# Patient Record
Sex: MALE | Race: ASIAN | NOT HISPANIC OR LATINO | Employment: STUDENT | URBAN - METROPOLITAN AREA
[De-identification: names, ages, dates, MRNs, and addresses within clinical notes are randomized per-mention and may not be internally consistent; named-entity substitution may affect disease eponyms.]

---

## 2017-03-10 ENCOUNTER — OFFICE VISIT (OUTPATIENT)
Dept: ALLERGY | Facility: CLINIC | Age: 27
End: 2017-03-10
Payer: COMMERCIAL

## 2017-03-10 ENCOUNTER — LAB VISIT (OUTPATIENT)
Dept: LAB | Facility: HOSPITAL | Age: 27
End: 2017-03-10
Attending: ALLERGY & IMMUNOLOGY
Payer: COMMERCIAL

## 2017-03-10 VITALS
HEIGHT: 72 IN | HEART RATE: 95 BPM | DIASTOLIC BLOOD PRESSURE: 90 MMHG | WEIGHT: 247.38 LBS | OXYGEN SATURATION: 96 % | SYSTOLIC BLOOD PRESSURE: 155 MMHG | BODY MASS INDEX: 33.51 KG/M2

## 2017-03-10 DIAGNOSIS — H10.45 CONJUNCTIVITIS, CHRONIC ALLERGIC: Chronic | ICD-10-CM

## 2017-03-10 DIAGNOSIS — R05.9 COUGH: ICD-10-CM

## 2017-03-10 DIAGNOSIS — R05.9 COUGH: Chronic | ICD-10-CM

## 2017-03-10 DIAGNOSIS — J31.0 CHRONIC RHINITIS: ICD-10-CM

## 2017-03-10 DIAGNOSIS — J31.0 CHRONIC RHINITIS: Primary | Chronic | ICD-10-CM

## 2017-03-10 PROCEDURE — 82785 ASSAY OF IGE: CPT

## 2017-03-10 PROCEDURE — 99204 OFFICE O/P NEW MOD 45 MIN: CPT | Mod: S$GLB,,, | Performed by: ALLERGY & IMMUNOLOGY

## 2017-03-10 PROCEDURE — 86003 ALLG SPEC IGE CRUDE XTRC EA: CPT | Mod: 59

## 2017-03-10 PROCEDURE — 86003 ALLG SPEC IGE CRUDE XTRC EA: CPT

## 2017-03-10 PROCEDURE — 99999 PR PBB SHADOW E&M-NEW PATIENT-LVL III: CPT | Mod: PBBFAC,,, | Performed by: ALLERGY & IMMUNOLOGY

## 2017-03-10 PROCEDURE — 1160F RVW MEDS BY RX/DR IN RCRD: CPT | Mod: S$GLB,,, | Performed by: ALLERGY & IMMUNOLOGY

## 2017-03-10 PROCEDURE — 36415 COLL VENOUS BLD VENIPUNCTURE: CPT | Mod: PO

## 2017-03-10 RX ORDER — MINERAL OIL
180 ENEMA (ML) RECTAL DAILY
COMMUNITY
End: 2018-07-18 | Stop reason: CLARIF

## 2017-03-10 NOTE — PROGRESS NOTES
"  Referring physician: No ref. provider found    Primary Care Physician: Primary Doctor No    Chief Complaint: Consult ("my allergies are acting up"watery eyes); Nasal Congestion; and Sinusitis    Patient is new to me  Patient is not accompanied.    Subjective:         HPI    Oracio Coley is a 27 y.o. male here for an evaluation related to chronic respiratory symptoms.  Patient reports that for the past 2-1/2 years he has had almost daily symptoms of rhinorrhea, postnasal drip with throat clearing and nasal itching.  He is also had almost daily watery and itchy eyes.  He also reports having itchy ears.  He also notes that he has frequent coughing.  He reports having a sensation in his chest that is like secretions are building up.  He also reports that he does have nocturnal awakening related to this chest sensation.  He reports that he takes Allegra for this symptom.  Patient reports that he takes Allegra 180 mg almost every 24 hours.  Despite this medication he does have breakthrough symptoms.  Patient reports that in 2010 he noted the onset of symptoms which included itchy eyes itchy ears and runny nose wall at a friend's house where there was a CAT residence.  After leaving the house symptoms continued for a while but then within 12 hours spontaneously resolved without medications.  Patient currently has a girlfriend who lives with a roommate who has a cat.  Patient does not visit his girlfriend's residence because of the problems he's noted in the past with cats.  However his girlfriend does visit his house frequently.  Patient is concerned that his chest symptoms may indicate that he is having some asthma.      Review of Systems  Constitutional: Negative for changes in appetite, unintentional weight loss, fever, chills and fatigue.   HENT: Negative for facial pain, nose bleeds, nasal congestion,  sinus pressure, and  voice change. Negative for ear discharge, ear pain, facial swelling, sore throat and trouble " swallowing.  Positive for postnasal drip, clearing her throat, itchy nose, and rhinorrhea.  Positive for itchy ears  Eyes: Negative for occular discharge, redness, and visual disturbance.  Positive for watery itchy eyes  Respiratory: Negative for chest tightness, shortness of breath, dyspnea on exertion, sputum production.  Positive for cough and possibly wheezing.  Cardiovascular: Negative for chest pain, palpatations and leg swelling.  Gastrointestinal: Negative for abdominal distension, abdominal pain, constipation, diarrhea, nausea,and vomiting.   Genitourinary: Negative for difficulty urinating.   Musculoskeletal: Negative for arthralgias, gait problems, joint swelling, myalgias and back pain.   The discharge of this problem.  The all Neurological: Negative for dizziness, syncope, weakness, light-headedness, and headaches.   Hematological: Negative for adenopathy, does not bruise or bleed easily.  Psychiatric/Behavioral: Negative for agitation, anxiety, behavioral problems, confusion, and insomnia.  Skin: Negative for rash.  Negative for pruritus. negative for dry skin    PMH:  Past medical history reviewed with patient and current for this visit.  Past Skin Test results: Patient is ever been skin tested  Past Immunotherapy: Patient has never been on immunotherapy    Family History:  Family history reviewed with patient and current for this visit    Social:  Smoker: Patient is a nonsmoker  Occupation: Patient is a ninth-grade     Environmental History:  Environmental history reviewed with patient and current for this visit          Objective:          Physical Exam  General:patient is well developed and well nourished, in no acute distress.  Mental Status:  Alert, oriented and cooperative  Head and Face: normocephalic   Allergic shiners: No  Eyes:   Pupils: ERRLA: Scleral conjunctiva: clear; Cornea: clear; Palprebal conjunctiva: normal: Eyelid Skin: normal  Ears:Tympanic membrane Left:normal  light reflex; Right: normal light reflex; External canals normal  Nose:  Nares: patent; Mucosa :pink and moist pink; Nasal septum: midline;Turbinates are of normal size bilaterally and do not occlude the nasal airway.  Mouth/Pharynx: tonsils present; posterior pharyngeal wall normal; tongue normal; teeth normal; voice quality normal.  Neck:  Cervical lymph nodes: small, non-tender, freely moveable both anterior and posterior cervical chain; Trachea: midline; Masses: none  Lungs: Air movement is good; respiratory effort is good; no respiratory distress; breath sounds are vesicular in all lung fields; no wheezing; normal expiratory time; patient is having a dry/semi-productive cough  Heart: regular rate and rhythm with mild respiratory variation; A1 and P2 are normal; no murmurs or gallops.  Abdomen:exam not done  Extremities: no cyanosis, clubbing or edema  Skin:no rashes or lesions present; skin hydrated and supple.    Skin Test Results: Deferred for today as patient is on Allegra.          Assessment:       1. Chronic rhinitis  D. farinae IgE    D. pteronyssinus IgE    IgE    Dog dander IgE    ALLERGEN CAT EPITHELLIUM    Complete PFT with bronchodilator    CANCELED: Pulmonary function test   2. Cough  D. farinae IgE    D. pteronyssinus IgE    IgE    Dog dander IgE    ALLERGEN CAT EPITHELLIUM    Complete PFT with bronchodilator    CANCELED: Pulmonary function test   3. Conjunctivitis, chronic allergic             Plan:         Return for re-visit: Return in about 4 weeks (around 4/7/2017).    Immunotherapy: No    Lab or X-ray: Yes; patient will get total IgE level along with allergen specific IgE levels to house dust mites, dogs and cats.    Outside medical records requested: No        Patient Instructions   1.  Patient will get lab today and will check as patient portal for the results.  2.  Patient will start Flonase 2 sprays in each nostril every 24 hours and continue this long-term  3.  Patient will get  pulmonary functions next week.  And after his pulmonary functions were done he will start Singulair 10 mg 1 tablet every 24 hours.  4.  Patient can continue his Allegra 180 mg per tablet every 24 hours as needed.  5.  I requested a revisit in 4 weeks, however the patient understands that he is to contact me if there are problems prior to this revisit.  Patient understands that if the lab does not indicate a specific ALLERGY, at some later date skin testing may be necessary.    Patient education:Nasal sinus physiology reviewed.  The role of inflammatory changes in symptom production was reviewed.  The role of sinusitis in producing postnasal drip and cough was reviewed.  The role of sinobronchial pathways in producing cough was reviewed. The role of reflux disease or gastroesophageal or laryngeal pharyngeal in producing cough was reviewed.  The role of using anti-inflammatory drugs to reduce the level of inflammation and thus the symptoms was reviewed.  Also reviewed was the possibility of cough equivalent asthma as the underlying etiology of this cough.  Patient understands that pulmonary functions will be helpful in elucidating the source of his cough.  The handout for rhinitis and sinusitis was reviewed with the patient.  Patient expressed understanding of these concepts and questions were answered.            Michaelle Walls MD

## 2017-03-10 NOTE — PATIENT INSTRUCTIONS
1.  Patient will get lab today and will check as patient portal for the results.  2.  Patient will start Flonase 2 sprays in each nostril every 24 hours and continue this long-term  3.  Patient will get pulmonary functions next week.  And after his pulmonary functions were done he will start Singulair 10 mg 1 tablet every 24 hours.  4.  Patient can continue his Allegra 180 mg per tablet every 24 hours as needed.  5.  I requested a revisit in 4 weeks, however the patient understands that he is to contact me if there are problems prior to this revisit.  Patient understands that if the lab does not indicate a specific ALLERGY, at some later date skin testing may be necessary.

## 2017-03-13 LAB
CAT DANDER IGE QN: 36.2 KU/L
D FARINAE IGE QN: <0.35 KU/L
D PTERONYSS IGE QN: <0.35 KU/L
DEPRECATED CAT DANDER IGE RAST QL: ABNORMAL
DEPRECATED D FARINAE IGE RAST QL: NORMAL
DEPRECATED D PTERONYSS IGE RAST QL: NORMAL
DEPRECATED DOG DANDER IGE RAST QL: ABNORMAL
DOG DANDER IGE QN: 3.81 KU/L
IGE SERPL-ACNC: 223 IU/ML

## 2017-03-15 ENCOUNTER — HOSPITAL ENCOUNTER (OUTPATIENT)
Dept: RESPIRATORY THERAPY | Facility: HOSPITAL | Age: 27
Discharge: HOME | End: 2017-03-15
Attending: ALLERGY & IMMUNOLOGY
Payer: COMMERCIAL

## 2017-03-15 DIAGNOSIS — R05.9 COUGH: Chronic | ICD-10-CM

## 2017-03-15 DIAGNOSIS — J31.0 CHRONIC RHINITIS: Chronic | ICD-10-CM

## 2017-03-23 ENCOUNTER — TELEPHONE (OUTPATIENT)
Dept: ALLERGY | Facility: CLINIC | Age: 27
End: 2017-03-23

## 2017-03-31 ENCOUNTER — OFFICE VISIT (OUTPATIENT)
Dept: ALLERGY | Facility: CLINIC | Age: 27
End: 2017-03-31
Payer: COMMERCIAL

## 2017-03-31 VITALS
SYSTOLIC BLOOD PRESSURE: 132 MMHG | DIASTOLIC BLOOD PRESSURE: 90 MMHG | BODY MASS INDEX: 33.62 KG/M2 | WEIGHT: 248.25 LBS | OXYGEN SATURATION: 96 % | HEIGHT: 72 IN | HEART RATE: 86 BPM

## 2017-03-31 DIAGNOSIS — H10.45 CONJUNCTIVITIS, ALLERGIC, CHRONIC: Chronic | ICD-10-CM

## 2017-03-31 DIAGNOSIS — Z88.9: ICD-10-CM

## 2017-03-31 DIAGNOSIS — R05.9 COUGH: Chronic | ICD-10-CM

## 2017-03-31 DIAGNOSIS — J30.1 NON-SEASONAL ALLERGIC RHINITIS DUE TO POLLEN: Chronic | ICD-10-CM

## 2017-03-31 DIAGNOSIS — J30.81 NON-SEASONAL ALLERGIC RHINITIS DUE TO ANIMAL HAIR AND DANDER: Primary | Chronic | ICD-10-CM

## 2017-03-31 PROBLEM — J30.89 ALLERGIC RHINITIS DUE TO HOUSE DUST MITE: Chronic | Status: ACTIVE | Noted: 2017-03-31

## 2017-03-31 PROBLEM — J30.89 ALLERGIC RHINITIS DUE TO HOUSE DUST MITE: Chronic | Status: RESOLVED | Noted: 2017-03-31 | Resolved: 2017-03-31

## 2017-03-31 PROCEDURE — 99214 OFFICE O/P EST MOD 30 MIN: CPT | Mod: 25,S$GLB,, | Performed by: ALLERGY & IMMUNOLOGY

## 2017-03-31 PROCEDURE — 1160F RVW MEDS BY RX/DR IN RCRD: CPT | Mod: S$GLB,,, | Performed by: ALLERGY & IMMUNOLOGY

## 2017-03-31 PROCEDURE — 95079 INGEST CHALLENGE ADDL 60 MIN: CPT | Mod: S$GLB,,, | Performed by: ALLERGY & IMMUNOLOGY

## 2017-03-31 PROCEDURE — 99999 PR PBB SHADOW E&M-EST. PATIENT-LVL III: CPT | Mod: PBBFAC,,, | Performed by: ALLERGY & IMMUNOLOGY

## 2017-03-31 RX ORDER — MONTELUKAST SODIUM 10 MG/1
10 TABLET ORAL NIGHTLY
Qty: 30 TABLET | Refills: 6 | Status: SHIPPED | OUTPATIENT
Start: 2017-03-31 | End: 2017-05-22 | Stop reason: SDUPTHER

## 2017-03-31 RX ORDER — FLUTICASONE PROPIONATE 50 MCG
2 SPRAY, SUSPENSION (ML) NASAL DAILY
COMMUNITY
End: 2018-07-18 | Stop reason: CLARIF

## 2017-03-31 RX ORDER — PREDNISONE 20 MG/1
20 TABLET ORAL 3 TIMES DAILY
Qty: 18 TABLET | Refills: 0 | Status: SHIPPED | OUTPATIENT
Start: 2017-03-31 | End: 2017-04-06

## 2017-03-31 NOTE — PROGRESS NOTES
Referring physician: No ref. provider found    Primary Care Physician: Primary Doctor No    Chief Complaint: Follow-up and Allergy Testing    Patient is known to me. The last visit was 3/10/2017  Patient is accompanied: No  Diagnosis at previous visit:  1.  Chronic rhinitis  2.  Cough  3.  Chronic conjunctivitis        Subjective:         HPI    Oracio Coley is a 27 y.o. male here for a follow-up visit related to chronic respiratory symptoms and skin testing.  Patient states that he did not start using his Flonase nor did he start Singulair after his previous visit.  He states that although his symptoms are somewhat better he still continues to have them.  He continues to have some nasal symptoms ocular symptoms and cough. His symptoms remain relatively unchanged from his previous visit.  He is here today primarily for skin testing.     Patient has some concerns because he is going to visit his girlfriend's parents house and there are cats and residents there.  He is concerned that he will be significant increase in his symptoms while visiting.    Summary visit 3/10/2017 with me:  Oracio Coley is a 27 y.o. male here for an evaluation related to chronic respiratory symptoms. Patient reports that for the past 2-1/2 years he has had almost daily symptoms of rhinorrhea, postnasal drip with throat clearing and nasal itching. He is also had almost daily watery and itchy eyes. He also reports having itchy ears. He also notes that he has frequent coughing. He reports having a sensation in his chest that is like secretions are building up. He also reports that he does have nocturnal awakening related to this chest sensation. He reports that he takes Allegra for this symptom. Patient reports that he takes Allegra 180 mg almost every 24 hours. Despite this medication he does have breakthrough symptoms.  Patient reports that in 2010 he noted the onset of symptoms which included itchy eyes itchy ears and runny nose wall at a  friend's house where there was a CAT residence. After leaving the house symptoms continued for a while but then within 12 hours spontaneously resolved without medications.  Patient currently has a girlfriend who lives with a roommate who has a cat. Patient does not visit his girlfriend's residence because of the problems he's noted in the past with cats. However his girlfriend does visit his house frequently.  Patient is concerned that his chest symptoms may indicate that he is having some asthma.      Review of Systems  Constitutional: Negative for changes in appetite, unintentional weight loss, fever, chills and fatigue.   HENT: Negative for facial pain, nose bleeds, nasal congestion, postnasal drip, throat clearing, sinus pressure, and voice change. Negative for ear discharge, ear pain, facial swelling, sore throat and trouble swallowing.  Eyes: Negative for occular discharge, redness, itching and visual disturbance.  Respiratory: Negative for chest tightness, shortness of breath, wheezing, dyspnea on exertion, sputum production and cough.   Cardiovascular: Negative for chest pain, palpatations and leg swelling.  Gastrointestinal: Negative for abdominal distension, abdominal pain, constipation, diarrhea, nausea,and vomiting.   Genitourinary: Negative for difficulty urinating.   Musculoskeletal: Negative for arthralgias, gait problems, joint swelling, myalgias and back pain.   Neurological: Negative for dizziness, syncope, weakness, light-headedness, and headaches.   Hematological: Negative for adenopathy, does not bruise or bleed easily.  Psychiatric/Behavioral: Negative for agitation, anxiety, behavioral problems, confusion, and insomnia.  Skin: Negative for rash.     PMH:  Reviewed with the patient and current for this visit    Family History:  Reviewed with the patient and current for this visit    Social History:  Reviewed with the patient and current for this visit     Environmental History:  Reviewed with  the patient and current for this visit          Objective:        Physical Exam  General:patient is well developed and well nourished, in no acute distress.  Mental Status:  Alert, oriented and cooperative  Head and Face: normocephalic   Allergic shiners: No  Eyes:   Pupils: ERRLA: Scleral conjunctiva: clear; Cornea: clear; Palprebal conjunctiva: normal: Eyelid Skin: normal  Ears:Tympanic membrane Left:intact and normal light reflex; Right:intact and normal light reflex; External canals normal bilaterally.  Nose:  Nares: patent; Mucosa :pink and moist; Nasal septum: midline;Turbinates are of normal size bilaterally and do not occlude the nasal airway.  Mouth/Pharynx: tonsils present; posterior pharyngeal wall normal; tongue normal; teeth normal; voice quality normal.  Neck:  Cervical lymph nodes: small, non-tender, freely moveable both anterior and posterior cervical chain; Trachea: midline; Masses: none  Lungs: Air movement is good; respiratory effort is good; no respiratory distress; breath sounds are vesicular in all lung fields; no wheezing; normal expiratory time; no cough.  Heart: regular rate and rhythm with mild respiratory variation; A1 and P2 are normal; no murmurs or gallops.  Abdomen:exam not done  Extremities: no cyanosis, clubbing or edema  Skin:no rashes or lesions present; skin hydrated and supple.    Skin Test Results: Prick skin tests were done today to a panel of 58 separate aeroallergens.  Patient had strong positive prick skin test to cat, dog, and grass pollens.  He also had positive prick skin test to tree pollens.  His positive histamine control and his negative saline control indicates this is a valid assessment of ALLERGIC recognition    Pulmonary function test: Patient had pulmonary function tests done on 3/15/2017 which were normal.    Laboratory evaluation: Patient had allergen specific IgE done 3/10/2017.  He was negative for house dust mites but strongly positive for cat allergen.  He  also had a mild positive to dog allergen.  His total IgE level was 223       Assessment:       1. Non-seasonal allergic rhinitis due to animal hair and dander     2. Non-seasonal allergic rhinitis due to pollen     3. Cough: PFTs normal     4. Conjunctivitis, allergic, chronic     5. Atopic diathesis: Positive prick skin test to cat, dog, and grass.             Plan:         Return for re-visit: Return in about 6 weeks (around 5/12/2017).    Immunotherapy: No    Lab or X-ray: No    Outside medical records requested: No        Patient Instructions   1.  Patient is to start Flonase 2 sprays in each nostril every 24 hours and Singulair 10 mg every 24 hours.  Patient understands to continue these medicines long-term.  2.  Patient understands and Allegra if needed for runny itchy sneezy symptoms.  3.  For his upcoming trip, I prescribed some prednisone to be used if needed for severe symptoms.  The prednisone directions are 20 mg 3 times a day for 6 days.  Patient understands to start this if his symptoms become severe.  I've also reviewed the side effects of steroids with the patient  4.  I requested a revisit in 6 weeks.  Patient understands that there are problems prior to that he is to contact me.      25 minutes spent face to face with this patient. 50% of time spent counseling this patient about the medical conditions (pathophysiology), the options and strategies for treatments, and the risks and benefits of treatments.    Patient education content included: Nasal sinus physiology reviewed.  The role of inflammatory changes in symptom production was reviewed.  The role of sinusitis in producing postnasal drip and cough was reviewed.  The role of sinobronchial pathways in producing cough was reviewed. The role of reflux disease or gastroesophageal or laryngeal pharyngeal in producing cough was reviewed.  The role of using anti-inflammatory drugs to reduce the level of inflammation and thus the symptoms was reviewed.   The handout for rhinitis and sinusitis was reviewed with the patient.  Pathophysiology of cough reviewed.  Normal pulmonary functions indicates this is probably not an asthma cough but if problems persist inhaled corticosteroids for asthma could be considered as an option.      Patient expressed understanding of these concepts and questions were answered.                Michaelle Walls MD

## 2017-03-31 NOTE — PATIENT INSTRUCTIONS
1.  Patient is to start Flonase 2 sprays in each nostril every 24 hours and Singulair 10 mg every 24 hours.  Patient understands to continue these medicines long-term.  2.  Patient understands and Allegra if needed for runny itchy sneezy symptoms.  3.  For his upcoming trip, I prescribed some prednisone to be used if needed for severe symptoms.  The prednisone directions are 20 mg 3 times a day for 6 days.  Patient understands to start this if his symptoms become severe.  I've also reviewed the side effects of steroids with the patient  4.  I requested a revisit in 6 weeks.  Patient understands that there are problems prior to that he is to contact me.

## 2017-05-29 RX ORDER — MONTELUKAST SODIUM 10 MG/1
10 TABLET ORAL NIGHTLY
Qty: 30 TABLET | Refills: 6 | Status: SHIPPED | OUTPATIENT
Start: 2017-05-29 | End: 2017-08-07 | Stop reason: SDUPTHER

## 2017-08-08 RX ORDER — MONTELUKAST SODIUM 10 MG/1
10 TABLET ORAL NIGHTLY
Qty: 30 TABLET | Refills: 2 | Status: SHIPPED | OUTPATIENT
Start: 2017-08-08 | End: 2017-08-15 | Stop reason: SDUPTHER

## 2017-08-08 NOTE — TELEPHONE ENCOUNTER
Spoke with pt letting him know that he has to scheduled a appt with  in order to have any more refills authorized pt stated that he thought he had unlimited refills for his medication.

## 2017-08-08 NOTE — TELEPHONE ENCOUNTER
patient is required to follow up with Dr. Walls for an office visit prior to any additional refills being authorized

## 2017-08-27 RX ORDER — MONTELUKAST SODIUM 10 MG/1
10 TABLET ORAL NIGHTLY
Qty: 30 TABLET | Refills: 6 | Status: SHIPPED | OUTPATIENT
Start: 2017-08-27 | End: 2018-07-18

## 2018-05-08 NOTE — PROGRESS NOTES
This note was created by combination of typed  and Dragon dictation.  Transcription errors may be present.  If there are any questions, please contact me.    Assessment & Plan:   ADDENDUM:   [facility] updated the records - rec'd HAV #15 2015 and typhoid. So UTD typhoid but would recommend HAV #2 and HBV #2 and #3.    ==============    Travel advice encounter - according to the immunization registry he needs to finish out his hepatitis-B shot series.  [facility] and thinks he had hepatitis a vaccine.  I will try to get old records from [facility] about vaccinations.  If not up-to-date would recommend hepatitis a shot as well as typhoid vaccine.  I have given him Cipro for traveler's diarrhea if it should arise.  Otherwise form filled out, no restrictions for travel  -     Discontinue: ciprofloxacin HCl (CIPRO) 500 MG tablet; Take 1 tablet (500 mg total) by mouth every 12 (twelve) hours.  Dispense: 10 tablet; Refill: 0  -     ciprofloxacin HCl (CIPRO) 500 MG tablet; Take 1 tablet (500 mg total) by mouth every 12 (twelve) hours.  Dispense: 10 tablet; Refill: 0        Medications Discontinued During This Encounter   Medication Reason    ciprofloxacin HCl (CIPRO) 500 MG tablet Reorder     Modified Medications    No medications on file     New Prescriptions    CIPROFLOXACIN HCL (CIPRO) 500 MG TABLET    Take 1 tablet (500 mg total) by mouth every 12 (twelve) hours.       Follow Up: No Follow-up on file.        Subjective:     Chief Complaint   Patient presents with    Follow-up       HPI  Oracio is a 28 y.o. male, last appointment with this clinic was Visit date not found.    NP  Upcoming international travel, he will be traveling with an [employer] groups to [place] to visit schools, at the grade school and high school level, as part of an information exchange about teaching and administrative methods.  The be traveling May 31st through mid June for 2  weeks.    He recalls having on the [facility] for vaccines about 3 years ago with travel to [place].  Thinks he has had hepatitis a vaccination.    We were able to pull up his [place] vaccine registry.  Up-to-date on tetanus, but had only had 1 of the hepatitis B shot series.  Hepatitis-A was not recorded.  I will try to obtain records from [facility].    Patient Care Team:  Primary Doctor No as PCP - General    Patient Active Problem List    Diagnosis Date Noted    Non-seasonal allergic rhinitis due to animal hair and dander 03/31/2017    Conjunctivitis, allergic, chronic 03/31/2017    Chronic cough 03/31/2017    Non-seasonal allergic rhinitis due to pollen 03/31/2017       PAST MEDICAL HISTORY:  History reviewed. No pertinent past medical history.    PAST SURGICAL HISTORY:  History reviewed. No pertinent surgical history.    Family History   Problem Relation Age of Onset    No Known Problems Mother     Arrhythmia Father     Gout Brother     Sleep apnea Brother        SOCIAL HISTORY:  Social History     Social History    Marital status: Single     Spouse name: N/A    Number of children: N/A    Years of education: N/A     Occupational History     of [employer]      Social History Main Topics    Smoking status: Never Smoker    Smokeless tobacco: Never Used    Alcohol use Yes      Comment: rarely    Drug use: No    Sexual activity: Not on file     Other Topics Concern    Not on file     Social History Narrative    No narrative on file       ALLERGIES AND MEDICATIONS: updated and reviewed.  Review of patient's allergies indicates:  No Known Allergies  Current Outpatient Prescriptions   Medication Sig Dispense Refill    fexofenadine (ALLEGRA) 180 MG tablet Take 180 mg by mouth once daily.      fluticasone (FLONASE) 50 mcg/actuation nasal spray 2 sprays by Each Nare route once daily.      montelukast (SINGULAIR) 10 mg tablet Take 1 tablet (10 mg total) by  "mouth every evening. 30 tablet 6     No current facility-administered medications for this visit.        Review of Systems   Constitutional: Negative for fever, malaise/fatigue and weight loss.   HENT: Negative for congestion.    Eyes: Negative for blurred vision and pain.   Respiratory: Negative for shortness of breath.    Cardiovascular: Negative for chest pain, palpitations and leg swelling.   Gastrointestinal: Negative for abdominal pain and blood in stool.   Musculoskeletal: Positive for joint pain.        Occ shoulder pain   Neurological: Negative for weakness.       Objective:   Physical Exam   Vitals:    05/09/18 1015 05/09/18 1046   BP: 136/88 130/86   BP Location: Right arm Left arm   Patient Position: Sitting Sitting   BP Method: Large (Automatic) Large (Manual)   Pulse: 75    Temp: 98.3 °F (36.8 °C)    TempSrc: Oral    SpO2: 96%    Weight: 111.8 kg (246 lb 7.6 oz)    Height: 5' 11" (1.803 m)     Body mass index is 34.38 kg/m².  Weight: 111.8 kg (246 lb 7.6 oz)   Height: 5' 11" (180.3 cm)     Physical Exam   Constitutional: He is oriented to person, place, and time. He appears well-developed and well-nourished. No distress.   Eyes: EOM are normal.   Cardiovascular: Normal rate, regular rhythm and normal heart sounds.    No murmur heard.  Pulmonary/Chest: Effort normal and breath sounds normal.   Abdominal: Soft. He exhibits no distension and no mass. There is no tenderness. There is no guarding.   Musculoskeletal: Normal range of motion.   Neurological: He is alert and oriented to person, place, and time. Coordination normal.   Skin: Skin is warm and dry.   Psychiatric: He has a normal mood and affect. His behavior is normal. Thought content normal.     "

## 2018-05-09 ENCOUNTER — OFFICE VISIT (OUTPATIENT)
Dept: FAMILY MEDICINE | Facility: CLINIC | Age: 28
End: 2018-05-09
Payer: COMMERCIAL

## 2018-05-09 ENCOUNTER — PATIENT MESSAGE (OUTPATIENT)
Dept: FAMILY MEDICINE | Facility: CLINIC | Age: 28
End: 2018-05-09

## 2018-05-09 VITALS
WEIGHT: 246.5 LBS | HEART RATE: 75 BPM | DIASTOLIC BLOOD PRESSURE: 86 MMHG | BODY MASS INDEX: 34.51 KG/M2 | SYSTOLIC BLOOD PRESSURE: 130 MMHG | HEIGHT: 71 IN | TEMPERATURE: 98 F | OXYGEN SATURATION: 96 %

## 2018-05-09 DIAGNOSIS — Z71.84 TRAVEL ADVICE ENCOUNTER: Primary | ICD-10-CM

## 2018-05-09 PROCEDURE — 99203 OFFICE O/P NEW LOW 30 MIN: CPT | Mod: S$GLB,,, | Performed by: INTERNAL MEDICINE

## 2018-05-09 PROCEDURE — 99999 PR PBB SHADOW E&M-EST. PATIENT-LVL III: CPT | Mod: PBBFAC,,, | Performed by: INTERNAL MEDICINE

## 2018-05-09 PROCEDURE — 3008F BODY MASS INDEX DOCD: CPT | Mod: CPTII,S$GLB,, | Performed by: INTERNAL MEDICINE

## 2018-05-09 RX ORDER — CIPROFLOXACIN 500 MG/1
500 TABLET ORAL EVERY 12 HOURS
Qty: 10 TABLET | Refills: 0 | Status: SHIPPED | OUTPATIENT
Start: 2018-05-09 | End: 2018-05-09 | Stop reason: SDUPTHER

## 2018-05-09 RX ORDER — CIPROFLOXACIN 500 MG/1
500 TABLET ORAL EVERY 12 HOURS
Qty: 10 TABLET | Refills: 0 | Status: SHIPPED | OUTPATIENT
Start: 2018-05-09 | End: 2018-07-18 | Stop reason: CLARIF

## 2018-07-18 ENCOUNTER — OFFICE VISIT (OUTPATIENT)
Dept: PODIATRY | Facility: CLINIC | Age: 28
End: 2018-07-18
Payer: COMMERCIAL

## 2018-07-18 ENCOUNTER — HOSPITAL ENCOUNTER (OUTPATIENT)
Dept: RADIOLOGY | Facility: HOSPITAL | Age: 28
Discharge: HOME | End: 2018-07-18
Attending: PODIATRIST
Payer: COMMERCIAL

## 2018-07-18 VITALS
DIASTOLIC BLOOD PRESSURE: 87 MMHG | WEIGHT: 246 LBS | HEIGHT: 61 IN | HEART RATE: 94 BPM | SYSTOLIC BLOOD PRESSURE: 157 MMHG | BODY MASS INDEX: 46.44 KG/M2

## 2018-07-18 DIAGNOSIS — M24.573 EQUINUS CONTRACTURE OF ANKLE: ICD-10-CM

## 2018-07-18 DIAGNOSIS — M79.672 FOOT PAIN, LEFT: ICD-10-CM

## 2018-07-18 DIAGNOSIS — M10.00 ACUTE IDIOPATHIC GOUT, UNSPECIFIED SITE: Primary | ICD-10-CM

## 2018-07-18 DIAGNOSIS — M10.00 ACUTE IDIOPATHIC GOUT, UNSPECIFIED SITE: ICD-10-CM

## 2018-07-18 PROCEDURE — 3008F BODY MASS INDEX DOCD: CPT | Mod: CPTII,S$GLB,, | Performed by: PODIATRIST

## 2018-07-18 PROCEDURE — 29540 STRAPPING ANKLE &/FOOT: CPT | Mod: LT,S$GLB,, | Performed by: PODIATRIST

## 2018-07-18 PROCEDURE — 99999 PR PBB SHADOW E&M-EST. PATIENT-LVL III: CPT | Mod: PBBFAC,,, | Performed by: PODIATRIST

## 2018-07-18 PROCEDURE — 73630 X-RAY EXAM OF FOOT: CPT | Mod: TC,LT

## 2018-07-18 PROCEDURE — 99203 OFFICE O/P NEW LOW 30 MIN: CPT | Mod: 25,S$GLB,, | Performed by: PODIATRIST

## 2018-07-18 PROCEDURE — 73630 X-RAY EXAM OF FOOT: CPT | Mod: 26,LT,, | Performed by: RADIOLOGY

## 2018-07-18 RX ORDER — INDOMETHACIN 50 MG/1
50 CAPSULE ORAL 2 TIMES DAILY WITH MEALS
Qty: 90 CAPSULE | Refills: 0 | Status: SHIPPED | OUTPATIENT
Start: 2018-07-18 | End: 2018-07-18

## 2018-07-18 RX ORDER — INDOMETHACIN 50 MG/1
50 CAPSULE ORAL 2 TIMES DAILY WITH MEALS
Qty: 90 CAPSULE | Refills: 0 | Status: SHIPPED | OUTPATIENT
Start: 2018-07-18 | End: 2018-08-15 | Stop reason: CLARIF

## 2018-07-18 NOTE — PROGRESS NOTES
Subjective:      Patient ID: Oracio Coley is a 28 y.o. male.    Chief Complaint: Foot Pain (left foot/  dair/05/09/2018)    Sharp intense pain left foot.  Gradual onset, worsening over past several days, aggravated by increased weight bearing, shoe gear, pressure.  No previous medical treatment.  OTC pain med not helping. Denies trauma, signs infection, surgery left foot.  Relates cycles of this about every 3 months for the past several years.      Review of Systems   Constitution: Negative for chills, diaphoresis, fever, malaise/fatigue and night sweats.   Cardiovascular: Negative for claudication, cyanosis, leg swelling and syncope.   Skin: Negative for color change, dry skin, nail changes, rash, suspicious lesions and unusual hair distribution.   Musculoskeletal: Positive for joint pain and joint swelling. Negative for falls, muscle cramps, muscle weakness and stiffness.   Gastrointestinal: Negative for constipation, diarrhea, nausea and vomiting.   Neurological: Negative for brief paralysis, disturbances in coordination, focal weakness, numbness, paresthesias, sensory change and tremors.           Objective:      Physical Exam   Constitutional: He is oriented to person, place, and time. He appears well-developed and well-nourished. He is cooperative. No distress.   Cardiovascular:   Pulses:       Popliteal pulses are 2+ on the right side, and 2+ on the left side.        Dorsalis pedis pulses are 2+ on the right side, and 2+ on the left side.        Posterior tibial pulses are 2+ on the right side, and 2+ on the left side.   Capillary refill 3 seconds all toes/distal feet, all toes/both feet warm to touch.      Negative lymphadenopathy bilateral popliteal fossa and tarsal tunnel.      Negavie lower extremity edema bilateral.     Musculoskeletal:        Right ankle: He exhibits normal range of motion, no swelling, no ecchymosis, no deformity, no laceration and normal pulse. Achilles tendon normal. Achilles  tendon exhibits no pain, no defect and normal Puentes's test results.   Intense pain to palpation sinus tarsi, and MTJ/stj motion left without deformity or loss of function, but with general warmth, minor swelling, minimal rubor.    Ankle dorsiflexion decreased at <10 degrees bilateral with moderate increase with knee flexion bilateral.    Otherwise, Normal angle, base, station of gait. All ten toes without clubbing, cyanosis, or signs of ischemia.  No pain to palpation bilateral lower extremities.  Range of motion, stability, muscle strength, and muscle tone normal bilateral feet and legs.     Lymphadenopathy: No inguinal adenopathy noted on the right or left side.   Negative lymphadenopathy bilateral popliteal fossa and tarsal tunnel.    Negative lymphangitic streaking bilateral feet/ankles/legs.   Neurological: He is alert and oriented to person, place, and time. He has normal strength. He displays no atrophy and no tremor. No sensory deficit. He exhibits normal muscle tone. Gait normal.   Reflex Scores:       Patellar reflexes are 2+ on the right side and 2+ on the left side.       Achilles reflexes are 2+ on the right side and 2+ on the left side.  Negative tinel sign to percussion sural, superficial peroneal, deep peroneal, saphenous, and posterior tibial nerves right and left ankles and feet.     Skin: Skin is warm, dry and intact. Capillary refill takes 2 to 3 seconds. No abrasion, no bruising, no burn, no ecchymosis, no laceration, no lesion and no rash noted. He is not diaphoretic. There is erythema (dull left rearfoot). No cyanosis. No pallor. Nails show no clubbing.     Otherwise, Skin is normal age and health appropriate color, turgor, texture, and temperature bilateral lower extremities without ulceration, hyperpigmentation, discoloration, masses nodules or cords palpated.  No ecchymosis, erythema, edema, or cardinal signs of infection bilateral lower extremities.     Psychiatric: He has a normal mood  and affect.             Assessment:       Encounter Diagnoses   Name Primary?    Acute idiopathic gout, unspecified site Yes    Equinus contracture of ankle     Foot pain, left          Plan:       Oracio was seen today for foot pain.    Diagnoses and all orders for this visit:    Acute idiopathic gout, unspecified site  -     X-Ray Foot Complete Left; Future  -     Basic metabolic panel; Future  -     CBC auto differential; Future  -     C-reactive protein; Future  -     Uric acid; Future  -     Sedimentation rate; Future    Equinus contracture of ankle  -     X-Ray Foot Complete Left; Future  -     Basic metabolic panel; Future  -     CBC auto differential; Future  -     C-reactive protein; Future  -     Uric acid; Future  -     Sedimentation rate; Future    Foot pain, left  -     X-Ray Foot Complete Left; Future  -     Basic metabolic panel; Future  -     CBC auto differential; Future  -     C-reactive protein; Future  -     Uric acid; Future  -     Sedimentation rate; Future    Other orders  -     Discontinue: indomethacin (INDOCIN) 50 MG capsule; Take 1 capsule (50 mg total) by mouth 2 (two) times daily with meals.  -     indomethacin (INDOCIN) 50 MG capsule; Take 1 capsule (50 mg total) by mouth 2 (two) times daily with meals.      I counseled the patient on his conditions, their implications and medical management.        Patient will stretch the tendo achilles complex three times daily as demonstrated in the office.  Literature was dispensed illustrating proper stretching technique.    I applied a plantar rest strapping to the patient's foot to offload symptomatic area, support the arch, and relieve pain.    Patient will obtain over the counter arch supports and wear them in shoes whenever possible.  Athletic shoes intended for walking or running are usually best.    The patient was advised that NSAID-type medications have two very important potential side effects: gastrointestinal irritation including  hemorrhage and renal injuries. He was asked to take the medication with food and to stop if he experiences any GI upset. I asked him to call for vomiting, abdominal pain or black/bloody stools. The patient expresses understanding of these issues and questions were answered.    Discussed conservative treatment with shoes of adequate dimensions, material, and style to alleviate symptoms and delay or prevent surgical intervention.    Rx xrays, blood work, indocin.    Dispense fx boot left - ambulate to tolerance weaning to shoes when symptoms allow.          Follow-up in about 1 month (around 8/18/2018).

## 2018-07-19 ENCOUNTER — TELEPHONE (OUTPATIENT)
Dept: PODIATRY | Facility: CLINIC | Age: 28
End: 2018-07-19

## 2018-07-19 RX ORDER — COLCHICINE 0.6 MG/1
0.6 TABLET ORAL DAILY
Qty: 15 TABLET | Refills: 0 | Status: SHIPPED | OUTPATIENT
Start: 2018-07-19 | End: 2018-08-15 | Stop reason: CLARIF

## 2018-07-19 NOTE — TELEPHONE ENCOUNTER
----- Message from Yinka Rodríguez DPM sent at 7/19/2018  7:08 AM CDT -----  Please tell patient he has gout.  Stop indocin.  Fill and take colchicine sent to pharmacy today.  Hydrate well with 4L water daily.  Keep scheduled follow up.

## 2018-08-15 ENCOUNTER — OFFICE VISIT (OUTPATIENT)
Dept: PODIATRY | Facility: CLINIC | Age: 28
End: 2018-08-15
Payer: COMMERCIAL

## 2018-08-15 VITALS
WEIGHT: 242 LBS | HEART RATE: 72 BPM | HEIGHT: 61 IN | SYSTOLIC BLOOD PRESSURE: 148 MMHG | BODY MASS INDEX: 45.69 KG/M2 | DIASTOLIC BLOOD PRESSURE: 89 MMHG

## 2018-08-15 DIAGNOSIS — M10.00 ACUTE IDIOPATHIC GOUT, UNSPECIFIED SITE: ICD-10-CM

## 2018-08-15 DIAGNOSIS — M77.9 CAPSULITIS: ICD-10-CM

## 2018-08-15 DIAGNOSIS — M24.573 EQUINUS CONTRACTURE OF ANKLE: ICD-10-CM

## 2018-08-15 DIAGNOSIS — M79.671 FOOT PAIN, RIGHT: Primary | ICD-10-CM

## 2018-08-15 PROCEDURE — 3008F BODY MASS INDEX DOCD: CPT | Mod: CPTII,S$GLB,, | Performed by: PODIATRIST

## 2018-08-15 PROCEDURE — 29540 STRAPPING ANKLE &/FOOT: CPT | Mod: RT,S$GLB,, | Performed by: PODIATRIST

## 2018-08-15 PROCEDURE — 99212 OFFICE O/P EST SF 10 MIN: CPT | Mod: 25,S$GLB,, | Performed by: PODIATRIST

## 2018-08-15 PROCEDURE — 99999 PR PBB SHADOW E&M-EST. PATIENT-LVL III: CPT | Mod: PBBFAC,,, | Performed by: PODIATRIST

## 2018-08-15 NOTE — PROGRESS NOTES
Subjective:      Patient ID: Oracio Coley is a 28 y.o. male.    Chief Complaint: Follow-up (left ft)    Sharp intense pain left foot.  Resolved entirely with steroid/colchicine.  xrays negative acute injury. Denies trauma, signs infection, surgery left foot.  Relates cycles of this about every 3 months for the past several years.        CC2 sharp deep pain bottom right forefoot.  Gradual onset, worsening over past several weeks - possibly compensatory, aggravated by increased weight bearing, shoe gear, pressure.  No previous medical treatment.  No self treatment.    Review of Systems   Constitution: Negative for chills, diaphoresis, fever, malaise/fatigue and night sweats.   Cardiovascular: Negative for claudication, cyanosis, leg swelling and syncope.   Skin: Negative for color change, dry skin, nail changes, rash, suspicious lesions and unusual hair distribution.   Musculoskeletal: Positive for joint pain and joint swelling. Negative for falls, muscle cramps, muscle weakness and stiffness.   Gastrointestinal: Negative for constipation, diarrhea, nausea and vomiting.   Neurological: Negative for brief paralysis, disturbances in coordination, focal weakness, numbness, paresthesias, sensory change and tremors.           Objective:      Physical Exam   Constitutional: He is oriented to person, place, and time. He appears well-developed and well-nourished. He is cooperative. No distress.   Cardiovascular:   Pulses:       Popliteal pulses are 2+ on the right side, and 2+ on the left side.        Dorsalis pedis pulses are 2+ on the right side, and 2+ on the left side.        Posterior tibial pulses are 2+ on the right side, and 2+ on the left side.   Capillary refill 3 seconds all toes/distal feet, all toes/both feet warm to touch.      Negative lymphadenopathy bilateral popliteal fossa and tarsal tunnel.      Negavie lower extremity edema bilateral.     Musculoskeletal:        Right ankle: He exhibits normal range of  motion, no swelling, no ecchymosis, no deformity, no laceration and normal pulse. Achilles tendon normal. Achilles tendon exhibits no pain, no defect and normal Puentes's test results.   Intense pain to palpation sinus tarsi, and MTJ/stj motion left resolved completely.    Pain to palpation inferior mtpj 2 right without evidence of trauma or infection.      Ankle dorsiflexion decreased at <10 degrees bilateral with moderate increase with knee flexion bilateral.    Otherwise, Normal angle, base, station of gait. All ten toes without clubbing, cyanosis, or signs of ischemia.  No pain to palpation bilateral lower extremities.  Range of motion, stability, muscle strength, and muscle tone normal bilateral feet and legs.     Lymphadenopathy: No inguinal adenopathy noted on the right or left side.   Negative lymphadenopathy bilateral popliteal fossa and tarsal tunnel.    Negative lymphangitic streaking bilateral feet/ankles/legs.   Neurological: He is alert and oriented to person, place, and time. He has normal strength. He displays no atrophy and no tremor. No sensory deficit. He exhibits normal muscle tone. Gait normal.   Reflex Scores:       Patellar reflexes are 2+ on the right side and 2+ on the left side.       Achilles reflexes are 2+ on the right side and 2+ on the left side.  Negative tinel sign to percussion sural, superficial peroneal, deep peroneal, saphenous, and posterior tibial nerves right and left ankles and feet.     Skin: Skin is warm, dry and intact. Capillary refill takes 2 to 3 seconds. No abrasion, no bruising, no burn, no ecchymosis, no laceration, no lesion and no rash noted. He is not diaphoretic. There is erythema (dull left rearfoot). No cyanosis. No pallor. Nails show no clubbing.     Otherwise, Skin is normal age and health appropriate color, turgor, texture, and temperature bilateral lower extremities without ulceration, hyperpigmentation, discoloration, masses nodules or cords palpated.  No  ecchymosis, erythema, edema, or cardinal signs of infection bilateral lower extremities.     Psychiatric: He has a normal mood and affect.             Assessment:       Encounter Diagnoses   Name Primary?    Foot pain, right Yes    Capsulitis     Equinus contracture of ankle     Acute idiopathic gout, unspecified site          Plan:       Oracio was seen today for follow-up.    Diagnoses and all orders for this visit:    Foot pain, right    Capsulitis    Equinus contracture of ankle    Acute idiopathic gout, unspecified site      I counseled the patient on his conditions, their implications and medical management.        Continue adequate hydration all times 4 L water daily.  Employ gout friendly diet as possible.    Patient will stretch the tendo achilles complex three times daily as demonstrated in the office.  Literature was dispensed illustrating proper stretching technique.    I applied a plantar rest strapping to the patient's right foot to offload symptomatic area, support the arch, and relieve pain.    Patient will obtain over the counter arch supports and wear them in shoes whenever possible.  Athletic shoes intended for walking or running are usually best.    The patient was advised that NSAID-type medications have two very important potential side effects: gastrointestinal irritation including hemorrhage and renal injuries. He was asked to take the medication with food and to stop if he experiences any GI upset. I asked him to call for vomiting, abdominal pain or black/bloody stools. The patient expresses understanding of these issues and questions were answered.    Discussed conservative treatment with shoes of adequate dimensions, material, and style to alleviate symptoms and delay or prevent surgical intervention.            Follow-up if symptoms worsen or fail to improve.

## 2020-03-04 ENCOUNTER — OFFICE VISIT (OUTPATIENT)
Dept: FAMILY MEDICINE | Facility: CLINIC | Age: 30
End: 2020-03-04
Payer: COMMERCIAL

## 2020-03-04 VITALS
HEIGHT: 71 IN | SYSTOLIC BLOOD PRESSURE: 126 MMHG | DIASTOLIC BLOOD PRESSURE: 80 MMHG | BODY MASS INDEX: 33.74 KG/M2 | TEMPERATURE: 98 F | HEART RATE: 78 BPM | OXYGEN SATURATION: 98 % | WEIGHT: 241 LBS

## 2020-03-04 DIAGNOSIS — M1A.09X0 IDIOPATHIC CHRONIC GOUT OF MULTIPLE SITES WITHOUT TOPHUS: ICD-10-CM

## 2020-03-04 DIAGNOSIS — M25.461 EFFUSION OF RIGHT KNEE: Primary | ICD-10-CM

## 2020-03-04 DIAGNOSIS — M25.561 RIGHT KNEE PAIN, UNSPECIFIED CHRONICITY: Primary | ICD-10-CM

## 2020-03-04 PROCEDURE — 99999 PR PBB SHADOW E&M-EST. PATIENT-LVL IV: CPT | Mod: PBBFAC,,, | Performed by: INTERNAL MEDICINE

## 2020-03-04 PROCEDURE — 99999 PR PBB SHADOW E&M-EST. PATIENT-LVL IV: ICD-10-PCS | Mod: PBBFAC,,, | Performed by: INTERNAL MEDICINE

## 2020-03-04 PROCEDURE — 99214 PR OFFICE/OUTPT VISIT, EST, LEVL IV, 30-39 MIN: ICD-10-PCS | Mod: S$GLB,,, | Performed by: INTERNAL MEDICINE

## 2020-03-04 PROCEDURE — 3008F BODY MASS INDEX DOCD: CPT | Mod: CPTII,S$GLB,, | Performed by: INTERNAL MEDICINE

## 2020-03-04 PROCEDURE — 3008F PR BODY MASS INDEX (BMI) DOCUMENTED: ICD-10-PCS | Mod: CPTII,S$GLB,, | Performed by: INTERNAL MEDICINE

## 2020-03-04 PROCEDURE — 99214 OFFICE O/P EST MOD 30 MIN: CPT | Mod: S$GLB,,, | Performed by: INTERNAL MEDICINE

## 2020-03-04 RX ORDER — TRAMADOL HYDROCHLORIDE 50 MG/1
50 TABLET ORAL EVERY 12 HOURS PRN
Qty: 6 TABLET | Refills: 0 | Status: SHIPPED | OUTPATIENT
Start: 2020-03-04 | End: 2021-11-22 | Stop reason: ALTCHOICE

## 2020-03-04 NOTE — PROGRESS NOTES
This note was created by combination of typed  and M-Modal dictation.  Transcription errors may be present.  If there are any questions, please contact me.    Assessment & Plan:   Effusion of right knee  -with history of gout though he feels like previous gout flare did not feel like this.  But cannot exclude.  Referral to Orthopedics.  Unfortunately no availability today and he does not think that he would be able to get in to see them today anyway.  We have tentatively scheduled him for tomorrow.  He may continue over-the-counter naproxen 440 mg b.i.d..  Tramadol for breakthrough pain.  Cautioned about side effects.  -     Ambulatory referral/consult to Orthopedics; Future; Expected date: 03/11/2020  -     traMADol (ULTRAM) 50 mg tablet; Take 1 tablet (50 mg total) by mouth every 12 (twelve) hours as needed for Pain.  Dispense: 6 tablet; Refill: 0    Idiopathic chronic gout of multiple sites without tophus  -will defer to Orthopedics for possibility of diagnostic and therapeutic tap of the knee.    There are no discontinued medications.    meds sent this encounter:  Medications Ordered This Encounter   Medications    traMADol (ULTRAM) 50 mg tablet     Sig: Take 1 tablet (50 mg total) by mouth every 12 (twelve) hours as needed for Pain.     Dispense:  6 tablet     Refill:  0     Quantity prescribed more than 7 day supply? No       Follow Up: No follow-ups on file.    Subjective:     Chief Complaint   Patient presents with    Knee Pain     right    Foot Pain    Ankle Pain       KERRY Narayanan is a 30 y.o. male, last appointment with this clinic was Visit date not found.    He saw Podiatry in summer of 2018 for gout.  It resolved with steroids and colchicine.  X-rays did not show any acute injury.  Elevated ESR and CRP.    He went on a trip to [facility]  Went to a natural park and there was a natural monument with 15 min of stone steps down the side of amount in.  At the time that he did he did not really  have any problems with but later on that night he started getting pain in his right knee.  It became swollen.  He took ibuprofen in type a, and then flew back home the next day, Saturday.  Since then it has been swollen though he feels like today it is a little less swollen.  And he is having troubles with weight-bearing.  Anterior of his knee.  Feels like he is walking funny on his right leg to compensate and as result he thinks like he is getting pain in his right ankle as well as his right anterior ft.  No at right swelling in the ankle or the foot.  He has been taking naproxen since.  Two tablets at a time.  Some modest improvement.  No specific time of injury.    When he went he had a layover in [place].  This was about 11 days ago.  Because there is concerns about corona virus, his girlfriend was instructed to stay home in quarantine.  He has not self quarantined.  His employer express concern is asking for authorization for him to return to work.  I told him because he is still in the incubation period I cannot provide reassurance that he is not contagious and cannot provide a return to work statement.  No fever no chills no URI sx.    Answers for HPI/ROS submitted by the patient on 3/4/2020   activity change: Yes  unexpected weight change: No  rhinorrhea: No  trouble swallowing: No  visual disturbance: No  chest tightness: No  polyuria: No  difficulty urinating: No  joint swelling: Yes  arthralgias: Yes  confusion: No  dysphoric mood: No      Patient Care Team:  Ethan Garvey MD as PCP - General (Internal Medicine)  Jem Louis MA as Care Coordinator    Patient Active Problem List    Diagnosis Date Noted    Non-seasonal allergic rhinitis due to animal hair and dander 03/31/2017    Conjunctivitis, allergic, chronic 03/31/2017    Chronic cough 03/31/2017    Non-seasonal allergic rhinitis due to pollen 03/31/2017       PAST MEDICAL HISTORY:  No past medical history on file.    PAST SURGICAL  HISTORY:  No past surgical history on file.    SOCIAL HISTORY:  Social History     Socioeconomic History    Marital status: Single     Spouse name: Not on file    Number of children: Not on file    Years of education: Not on file    Highest education level: Not on file   Occupational History    Occupation:  of      Employer: [employer]    Social Needs    Financial resource strain: Not hard at all    Food insecurity:     Worry: Never true     Inability: Never true    Transportation needs:     Medical: No     Non-medical: No   Tobacco Use    Smoking status: Never Smoker    Smokeless tobacco: Never Used   Substance and Sexual Activity    Alcohol use: Yes     Frequency: Monthly or less     Drinks per session: 3 or 4     Binge frequency: Never     Comment: rarely    Drug use: No    Sexual activity: Not on file   Lifestyle    Physical activity:     Days per week: 1 day     Minutes per session: 20 min    Stress: Not at all   Relationships    Social connections:     Talks on phone: More than three times a week     Gets together: Once a week     Attends L[employer]-day service: Not on file     Active member of club or organization: No     Attends meetings of clubs or organizations: Never     Relationship status: Living with partner   Other Topics Concern    Not on file   Social History Narrative    Not on file       ALLERGIES AND MEDICATIONS: updated and reviewed.  Review of patient's allergies indicates:  No Known Allergies  No current outpatient medications on file.     No current facility-administered medications for this visit.        Review of Systems   HENT: Negative for hearing loss.    Eyes: Negative for discharge.   Respiratory: Negative for wheezing.    Cardiovascular: Negative for chest pain and palpitations.   Gastrointestinal: Negative for blood in stool, constipation, diarrhea and vomiting.   Genitourinary: Negative for hematuria and urgency.   Musculoskeletal:  "Negative for neck pain.   Neurological: Negative for weakness and headaches.   Endo/Heme/Allergies: Negative for polydipsia.       Objective:   Physical Exam   Vitals:    03/04/20 1021   BP: 126/80   BP Location: Right arm   Patient Position: Sitting   BP Method: Medium (Manual)   Pulse: 78   Temp: 98.3 °F (36.8 °C)   TempSrc: Oral   SpO2: 98%   Weight: 109.3 kg (241 lb)   Height: 5' 11" (1.803 m)    Body mass index is 33.61 kg/m².            Physical Exam   Constitutional: He is oriented to person, place, and time. He appears well-developed and well-nourished. No distress.   HENT:   Head: Normocephalic and atraumatic.   Eyes: No scleral icterus.   Pulmonary/Chest: Effort normal.   Musculoskeletal:   The right knee there is a large effusion of the knee.  Unable to fully flex because of discomfort.  Cee unable to perform.  Lachman negative.  Varus and valgus stress elicits no pain.   Neurological: He is alert and oriented to person, place, and time.   Skin: Skin is warm and dry.   Psychiatric: He has a normal mood and affect. His behavior is normal. Thought content normal.     "

## 2020-03-06 ENCOUNTER — OFFICE VISIT (OUTPATIENT)
Dept: ORTHOPEDICS | Facility: CLINIC | Age: 30
End: 2020-03-06
Attending: ORTHOPAEDIC SURGERY
Payer: COMMERCIAL

## 2020-03-06 ENCOUNTER — PATIENT MESSAGE (OUTPATIENT)
Dept: FAMILY MEDICINE | Facility: CLINIC | Age: 30
End: 2020-03-06

## 2020-03-06 VITALS
OXYGEN SATURATION: 97 % | WEIGHT: 243.81 LBS | DIASTOLIC BLOOD PRESSURE: 80 MMHG | HEART RATE: 70 BPM | HEIGHT: 71 IN | BODY MASS INDEX: 34.13 KG/M2 | SYSTOLIC BLOOD PRESSURE: 120 MMHG | RESPIRATION RATE: 18 BRPM

## 2020-03-06 DIAGNOSIS — M79.671 RIGHT FOOT PAIN: ICD-10-CM

## 2020-03-06 DIAGNOSIS — M1A.09X0 IDIOPATHIC CHRONIC GOUT OF MULTIPLE SITES WITHOUT TOPHUS: Primary | ICD-10-CM

## 2020-03-06 DIAGNOSIS — M25.461 EFFUSION OF RIGHT KNEE: ICD-10-CM

## 2020-03-06 DIAGNOSIS — M25.461 EFFUSION OF RIGHT KNEE JOINT: Primary | ICD-10-CM

## 2020-03-06 PROCEDURE — 99203 PR OFFICE/OUTPT VISIT, NEW, LEVL III, 30-44 MIN: ICD-10-PCS | Mod: S$GLB,,, | Performed by: ORTHOPAEDIC SURGERY

## 2020-03-06 PROCEDURE — 3008F PR BODY MASS INDEX (BMI) DOCUMENTED: ICD-10-PCS | Mod: CPTII,S$GLB,, | Performed by: ORTHOPAEDIC SURGERY

## 2020-03-06 PROCEDURE — 3008F BODY MASS INDEX DOCD: CPT | Mod: CPTII,S$GLB,, | Performed by: ORTHOPAEDIC SURGERY

## 2020-03-06 PROCEDURE — 99999 PR PBB SHADOW E&M-EST. PATIENT-LVL III: ICD-10-PCS | Mod: PBBFAC,,, | Performed by: ORTHOPAEDIC SURGERY

## 2020-03-06 PROCEDURE — 99203 OFFICE O/P NEW LOW 30 MIN: CPT | Mod: S$GLB,,, | Performed by: ORTHOPAEDIC SURGERY

## 2020-03-06 PROCEDURE — 99999 PR PBB SHADOW E&M-EST. PATIENT-LVL III: CPT | Mod: PBBFAC,,, | Performed by: ORTHOPAEDIC SURGERY

## 2020-03-06 RX ORDER — METHYLPREDNISOLONE 4 MG/1
TABLET ORAL
Qty: 1 PACKAGE | Refills: 0 | Status: SHIPPED | OUTPATIENT
Start: 2020-03-06 | End: 2021-01-07 | Stop reason: SDUPTHER

## 2020-03-06 NOTE — PROGRESS NOTES
Chief Complaint   Patient presents with    Right Knee - Pain       This patient was seen in consultation at the request of Dr. Ethan Garvey     Initial visit (03/06/2020): Oracio Coley is a 30 y.o. male who presents today complaining of right knee and right foot     Duration of symptoms:  A couple weeks   Trauma or new activity: yes - recently went on vacation in [facility] - did a lot of walking and noted swelling and pain to the knee  Was not able to walk normally earlier this week  Did not erythema and warmth surrounding the knee. No fevers  Does have history of gout -- normally has in the left ankle  Pain is constant and improving  Aggravating factors: weight bearing   Relieving factors: rest   Radicular symptoms: no numbness, paresthesias   Associated symptoms:  swelling and limited range of motion.    Prior treatment:  OTC NSAIDs  with improvement in pain.     Pain does interfere with activities of daily living .    This is the extent of the patient's complaints at this time.     Occupation: teacher     Review of Systems   All other systems reviewed and are negative.        Review of patient's allergies indicates:   Allergen Reactions    Cat/feline products Itching and Swelling    Grass pollen-jonathan grass standard          Current Outpatient Medications:     traMADol (ULTRAM) 50 mg tablet, Take 1 tablet (50 mg total) by mouth every 12 (twelve) hours as needed for Pain. (Patient not taking: Reported on 3/6/2020), Disp: 6 tablet, Rfl: 0    No past medical history on file.    Patient Active Problem List   Diagnosis    Non-seasonal allergic rhinitis due to animal hair and dander    Conjunctivitis, allergic, chronic    Chronic cough    Non-seasonal allergic rhinitis due to pollen    Idiopathic chronic gout of multiple sites without tophus       No past surgical history on file.    Social History     Tobacco Use    Smoking status: Never Smoker    Smokeless tobacco: Never Used   Substance Use Topics     "Alcohol use: Yes     Frequency: Monthly or less     Drinks per session: 3 or 4     Binge frequency: Never     Comment: rarely    Drug use: No       Family History   Problem Relation Age of Onset    No Known Problems Mother     Arrhythmia Father     Gout Brother     Sleep apnea Brother        Physical Exam:   Vitals:    03/06/20 0931   BP: 120/80   Pulse: 70   Resp: 18   SpO2: 97%   Weight: 110.6 kg (243 lb 13.3 oz)   Height: 5' 11" (1.803 m)   PainSc: 0-No pain       General: Weight: 110.6 kg (243 lb 13.3 oz) Body mass index is 34.01 kg/m².   Patient is alert, awake and oriented to time, place and person. Mood and affect are appropriate.  Patient does not appear to be in any distress, denies any constitutional symptoms and appears stated age.   HEENT:  Pupils are equal and round, sclera are not injected. External examination of ears and nose reveals no abnormalities. Cranial nerves II-X are grossly intact  Skin:  no rashes, abrasions or open wounds on the affected extremity   Resp:  No respiratory distress or audible wheezing   CV: 2+  pulses, all extremities warm and well perfused   Right Knee   Moderate effusion   No warmth or erythema   AROM 0-90   Non tender over medial joint line, lateral joint line, patellar and quadriceps tendon, patella  No pain with patellar compression  Ligamentously stable    Right foot and ankle  Tender palpation over fibula and ATFL.  No ecchymosis, swelling, erythema or warmth  Full range of motion of the ankle without pain  erythema and mild warmth over PIP of second toe with spread into 2.3 webspaces  Ltsi s/s/sp/dp/t  + ehl/fhl/ta/gs  2+ DP      Imaging: 3 views right knee: no degenerative changes or fracture     I personally reviewed and interpreted the patient's imaging obtained today in clinic     Assessment: 30 y.o. male with right knee and ankle/foot pain  - changes are mild in a more likely due to gout.  Cellulitis and septic arthritis are in the differential however I " have low suspicion for this given improvement without any treatment and maintained ability to weightbear    Plan:   - will have his PCP treat for gout  - instructed him to go to the emergency room over the weekend if his pain, erythema, swelling or warmth increases or if he starts having fevers.  - Return to clinic in 1 week if symptoms worsen or fail to improve.    All questions were answered in detail. The patient is in full agreement with the treatment plan and will proceed accordingly.    A note notifying Dr. Ethan Garvey of my findings was sent via the electronic medical record     This note was created by combination of typed  and M-Modal dictation. Transcription and phonetic errors may be present.  If there are any questions, please contact me.

## 2020-03-06 NOTE — LETTER
March 6, 2020      Ethan Garvey MD  [address]  [place] [address]           [facility]  [address] [facility], [address]  [place] [address]  Phone: [phone]          Patient: Oracio Coley   MR Number: [id number]   YOB: 1990   Date of Visit: 3/6/2020       Dear Dr. Ethan Garvey:    Thank you for referring Oracio Coley to me for evaluation. Attached you will find relevant portions of my assessment and plan of care.    If you have questions, please do not hesitate to call me. I look forward to following Oracio Coley along with you.    Sincerely,    Neha Chauhan MD    Enclosure  CC:  No Recipients    If you would like to receive this communication electronically, please contact [email or web address] or [phone] to request more information on [employer] Link access.    For providers and/or their staff who would like to refer a patient to [facility], please contact us through our one-stop-shop provider referral line, [facility] , at [phone].    If you feel you have received this communication in error or would no longer like to receive these types of communications, please e-mail [email or web address]

## 2020-03-11 ENCOUNTER — PATIENT MESSAGE (OUTPATIENT)
Dept: ORTHOPEDICS | Facility: CLINIC | Age: 30
End: 2020-03-11

## 2020-07-20 ENCOUNTER — PATIENT MESSAGE (OUTPATIENT)
Dept: FAMILY MEDICINE | Facility: CLINIC | Age: 30
End: 2020-07-20

## 2020-07-20 DIAGNOSIS — Z23 NEED FOR HEPATITIS B VACCINATION: Primary | ICD-10-CM

## 2020-07-21 NOTE — TELEPHONE ENCOUNTER
Can we pull up LINKS? He has had 2 MMR, Tdap, but they need proof of meningitis vaccine.  If not UTD, needs booster

## 2020-08-14 DIAGNOSIS — Z11.59 NEED FOR HEPATITIS C SCREENING TEST: ICD-10-CM

## 2021-04-06 ENCOUNTER — PATIENT MESSAGE (OUTPATIENT)
Dept: ADMINISTRATIVE | Facility: HOSPITAL | Age: 31
End: 2021-04-06

## 2021-07-07 ENCOUNTER — PATIENT MESSAGE (OUTPATIENT)
Dept: ADMINISTRATIVE | Facility: HOSPITAL | Age: 31
End: 2021-07-07

## 2021-10-04 ENCOUNTER — PATIENT MESSAGE (OUTPATIENT)
Dept: ADMINISTRATIVE | Facility: HOSPITAL | Age: 31
End: 2021-10-04

## 2021-11-22 ENCOUNTER — OFFICE VISIT (OUTPATIENT)
Dept: FAMILY MEDICINE | Facility: CLINIC | Age: 31
End: 2021-11-22
Payer: COMMERCIAL

## 2021-11-22 VITALS
DIASTOLIC BLOOD PRESSURE: 94 MMHG | SYSTOLIC BLOOD PRESSURE: 142 MMHG | HEIGHT: 71 IN | BODY MASS INDEX: 36.68 KG/M2 | OXYGEN SATURATION: 99 % | HEART RATE: 82 BPM | WEIGHT: 262 LBS | TEMPERATURE: 98 F

## 2021-11-22 DIAGNOSIS — R41.840 INATTENTION: Primary | ICD-10-CM

## 2021-11-22 DIAGNOSIS — Z23 NEEDS FLU SHOT: ICD-10-CM

## 2021-11-22 DIAGNOSIS — L72.0 INCLUSION CYST: ICD-10-CM

## 2021-11-22 DIAGNOSIS — R41.840 INATTENTION: ICD-10-CM

## 2021-11-22 DIAGNOSIS — Z00.00 NORMAL PHYSICAL EXAM: Primary | ICD-10-CM

## 2021-11-22 PROCEDURE — 90686 FLU VACCINE (QUAD) GREATER THAN OR EQUAL TO 3YO PRESERVATIVE FREE IM: ICD-10-PCS | Mod: S$GLB,,, | Performed by: INTERNAL MEDICINE

## 2021-11-22 PROCEDURE — 99999 PR PBB SHADOW E&M-EST. PATIENT-LVL V: CPT | Mod: PBBFAC,,, | Performed by: INTERNAL MEDICINE

## 2021-11-22 PROCEDURE — 90471 FLU VACCINE (QUAD) GREATER THAN OR EQUAL TO 3YO PRESERVATIVE FREE IM: ICD-10-PCS | Mod: S$GLB,,, | Performed by: INTERNAL MEDICINE

## 2021-11-22 PROCEDURE — 99999 PR PBB SHADOW E&M-EST. PATIENT-LVL V: ICD-10-PCS | Mod: PBBFAC,,, | Performed by: INTERNAL MEDICINE

## 2021-11-22 PROCEDURE — 99214 PR OFFICE/OUTPT VISIT, EST, LEVL IV, 30-39 MIN: ICD-10-PCS | Mod: 25,S$GLB,, | Performed by: INTERNAL MEDICINE

## 2021-11-22 PROCEDURE — 90471 IMMUNIZATION ADMIN: CPT | Mod: S$GLB,,, | Performed by: INTERNAL MEDICINE

## 2021-11-22 PROCEDURE — 99214 OFFICE O/P EST MOD 30 MIN: CPT | Mod: 25,S$GLB,, | Performed by: INTERNAL MEDICINE

## 2021-11-22 PROCEDURE — 90686 IIV4 VACC NO PRSV 0.5 ML IM: CPT | Mod: S$GLB,,, | Performed by: INTERNAL MEDICINE

## 2021-11-22 RX ORDER — DOXYCYCLINE 100 MG/1
100 CAPSULE ORAL EVERY 12 HOURS
Qty: 14 CAPSULE | Refills: 0 | Status: SHIPPED | OUTPATIENT
Start: 2021-11-22 | End: 2021-11-29

## 2021-11-27 ENCOUNTER — PATIENT MESSAGE (OUTPATIENT)
Dept: FAMILY MEDICINE | Facility: CLINIC | Age: 31
End: 2021-11-27
Payer: COMMERCIAL

## 2021-12-11 ENCOUNTER — PATIENT MESSAGE (OUTPATIENT)
Dept: FAMILY MEDICINE | Facility: CLINIC | Age: 31
End: 2021-12-11
Payer: COMMERCIAL

## 2021-12-11 DIAGNOSIS — M1A.09X0 IDIOPATHIC CHRONIC GOUT OF MULTIPLE SITES WITHOUT TOPHUS: Primary | ICD-10-CM

## 2021-12-12 RX ORDER — INDOMETHACIN 75 MG/1
75 CAPSULE, EXTENDED RELEASE ORAL 2 TIMES DAILY
Qty: 30 CAPSULE | Refills: 0 | Status: SHIPPED | OUTPATIENT
Start: 2021-12-12

## 2021-12-12 RX ORDER — COLCHICINE 0.6 MG/1
0.6 TABLET ORAL DAILY
Qty: 15 TABLET | Refills: 0 | Status: SHIPPED | OUTPATIENT
Start: 2021-12-12 | End: 2021-12-23

## 2022-01-08 DIAGNOSIS — M1A.09X0 IDIOPATHIC CHRONIC GOUT OF MULTIPLE SITES WITHOUT TOPHUS: ICD-10-CM

## 2022-01-08 NOTE — TELEPHONE ENCOUNTER
No new care gaps identified.  Powered by [email or web address]. Reference number: [id number].   1/08/2022 7:30:56 AM CST

## 2022-01-09 RX ORDER — COLCHICINE 0.6 MG/1
TABLET ORAL
Qty: 15 TABLET | Refills: 0 | Status: SHIPPED | OUTPATIENT
Start: 2022-01-09 | End: 2022-01-11

## 2022-01-10 DIAGNOSIS — M1A.09X0 IDIOPATHIC CHRONIC GOUT OF MULTIPLE SITES WITHOUT TOPHUS: ICD-10-CM

## 2022-01-10 NOTE — TELEPHONE ENCOUNTER
No new care gaps identified.  Powered by [facility] [employer]. Reference number: [id number].   1/10/2022 10:02:51 AM CST

## 2022-01-11 ENCOUNTER — OFFICE VISIT (OUTPATIENT)
Dept: PSYCHIATRY | Facility: CLINIC | Age: 32
End: 2022-01-11
Payer: COMMERCIAL

## 2022-01-11 VITALS
DIASTOLIC BLOOD PRESSURE: 95 MMHG | BODY MASS INDEX: 36.74 KG/M2 | WEIGHT: 263.44 LBS | HEART RATE: 70 BPM | SYSTOLIC BLOOD PRESSURE: 140 MMHG

## 2022-01-11 DIAGNOSIS — R41.840 INATTENTION: ICD-10-CM

## 2022-01-11 DIAGNOSIS — F43.29 ADJUSTMENT DISORDER WITH ACADEMIC INHIBITION: Primary | ICD-10-CM

## 2022-01-11 PROCEDURE — 3077F SYST BP >= 140 MM HG: CPT | Mod: CPTII,S$GLB,, | Performed by: NURSE PRACTITIONER

## 2022-01-11 PROCEDURE — 1160F RVW MEDS BY RX/DR IN RCRD: CPT | Mod: CPTII,S$GLB,, | Performed by: NURSE PRACTITIONER

## 2022-01-11 PROCEDURE — 1160F PR REVIEW ALL MEDS BY PRESCRIBER/CLIN PHARMACIST DOCUMENTED: ICD-10-PCS | Mod: CPTII,S$GLB,, | Performed by: NURSE PRACTITIONER

## 2022-01-11 PROCEDURE — 3008F BODY MASS INDEX DOCD: CPT | Mod: CPTII,S$GLB,, | Performed by: NURSE PRACTITIONER

## 2022-01-11 PROCEDURE — 3008F PR BODY MASS INDEX (BMI) DOCUMENTED: ICD-10-PCS | Mod: CPTII,S$GLB,, | Performed by: NURSE PRACTITIONER

## 2022-01-11 PROCEDURE — 3077F PR MOST RECENT SYSTOLIC BLOOD PRESSURE >= 140 MM HG: ICD-10-PCS | Mod: CPTII,S$GLB,, | Performed by: NURSE PRACTITIONER

## 2022-01-11 PROCEDURE — 1159F PR MEDICATION LIST DOCUMENTED IN MEDICAL RECORD: ICD-10-PCS | Mod: CPTII,S$GLB,, | Performed by: NURSE PRACTITIONER

## 2022-01-11 PROCEDURE — 3080F PR MOST RECENT DIASTOLIC BLOOD PRESSURE >= 90 MM HG: ICD-10-PCS | Mod: CPTII,S$GLB,, | Performed by: NURSE PRACTITIONER

## 2022-01-11 PROCEDURE — 1159F MED LIST DOCD IN RCRD: CPT | Mod: CPTII,S$GLB,, | Performed by: NURSE PRACTITIONER

## 2022-01-11 PROCEDURE — 99205 OFFICE O/P NEW HI 60 MIN: CPT | Mod: S$GLB,,, | Performed by: NURSE PRACTITIONER

## 2022-01-11 PROCEDURE — 99999 PR PBB SHADOW E&M-EST. PATIENT-LVL III: ICD-10-PCS | Mod: PBBFAC,,, | Performed by: NURSE PRACTITIONER

## 2022-01-11 PROCEDURE — 99999 PR PBB SHADOW E&M-EST. PATIENT-LVL III: CPT | Mod: PBBFAC,,, | Performed by: NURSE PRACTITIONER

## 2022-01-11 PROCEDURE — 99205 PR OFFICE/OUTPT VISIT, NEW, LEVL V, 60-74 MIN: ICD-10-PCS | Mod: S$GLB,,, | Performed by: NURSE PRACTITIONER

## 2022-01-11 PROCEDURE — 3080F DIAST BP >= 90 MM HG: CPT | Mod: CPTII,S$GLB,, | Performed by: NURSE PRACTITIONER

## 2022-01-11 RX ORDER — BUPROPION HYDROCHLORIDE 300 MG/1
300 TABLET ORAL DAILY
Qty: 30 TABLET | Refills: 11 | Status: SHIPPED | OUTPATIENT
Start: 2022-01-11 | End: 2023-01-11

## 2022-01-11 RX ORDER — BUPROPION HYDROCHLORIDE 150 MG/1
150 TABLET ORAL EVERY MORNING
COMMUNITY
Start: 2022-01-06

## 2022-01-11 RX ORDER — COLCHICINE 0.6 MG/1
TABLET ORAL
Qty: 15 TABLET | Refills: 0 | Status: SHIPPED | OUTPATIENT
Start: 2022-01-11 | End: 2022-01-11 | Stop reason: CLARIF

## 2022-01-11 RX ORDER — COLCHICINE 0.6 MG/1
0.6 CAPSULE ORAL DAILY PRN
Qty: 30 CAPSULE | Refills: 0 | Status: SHIPPED | OUTPATIENT
Start: 2022-01-11 | End: 2022-03-07 | Stop reason: SDUPTHER

## 2022-01-11 NOTE — PROGRESS NOTES
"Outpatient Psychiatry Initial Visit (MD/NP)    1/11/2022    Oracio Coley, a 32 y.o. male, presenting for initial evaluation visit. Met with patient.    Reason for Encounter: Referral from Ethan Garvey MD. Patient complains of attention problems.    History of Present Illness:    Pt is a 32-year old male who presented for psychiatric ADHD evaluation upon referral from PCP. Pt describes trouble motivating himself to complete school work and engages in distractions.  Discussed behavioral modification to eliminate distractions such as social media and Internet.  Pt has no past hx of ADHD in childhood; no evidence of hyperactivity.  Thought processes appear clear and organized. Pt appears to be struggling with adjustment to workload and time management skills.  Denies SI/HI/AVH.     Per Dr. Garvey note 11/22/21:  "Issues with focus and attention affecting his home life as well as school studies.  Growing up he was in [employer] student, did not really have troubles with focus or concentration.  His 1st 2 years of college did poorly and was able to rally and focus and concentrate and did okay his last 2 years.  He has worked since as a teacher, as an   He has been in graduate school on and off for the past several years.  It has been the last couple of years now that he has had more issues with focus and concentration  He is now unable to sit and focus on lectures.  Most of his lectures are virtual and he has to keep a separate browser open to brows other things as he will lose attention fairly quickly.  He is wondering if he may have ADD.  His girlfriend is also noticing issues with his focus and concentration.  She notes that he will be sitting there looking at her but then he will not recall certain details of their conversation.  He thinks that she may not have actually mention the things that she is accusing him of forgetting.  He denies depression or anxiety.  Denies feeling overwhelmed  Notes generally " "restorative sleep.  Some snoring but no witnessed apnea.  Has been self medicating with caffeine/tea"     ADHD Adult:  · Have difficulty sustaining attention in tasks or fun activities?  yes   · Don't follow through on instructions and fail to finish work?  no  · Have difficulty organizing tasks and activities?  yes   · Avoid, dislike, or are reluctant to engage in work thar requires sustained mental effort?  yes   · Easily distracted?  yes   · Forgetful in daily activities?  yes   · Fidget with hands or feet, or squirm in seat?  no  · Have difficulty engaging in leisure activities or doing fun things quietly?  no  · Feel "on the go" or "driven by a motor"?  no  · Blurt out answers before questions have been completed?  no  · Have difficulty waiting your turn, are impatient?  no  · Interrupt or intrude on others?  no    Past Psychiatric History: none    Psychiatric Medications: currently taking (Wellbutrin 150 mg x 5 weeks)    Past trials include: Strattera for 3 days, Wellbutrin    Psychosocial History: In college at [employer] and doing well.  Denies family hx of mental illness.  No cigarettes, drinks alcohol sparingly, no hx of addiction problems.      Stressors/Triggers:    Coping Skills:    Medical History: gout    Review Of Systems:     GENERAL:  No weight gain or loss  SKIN:  No rashes or lacerations  HEAD:  No headaches  EYES:  No exophthalmos, jaundice or blindness  EARS:  No dizziness, tinnitus or hearing loss  NOSE:  No changes in smell  MOUTH & THROAT:  No dyskinetic movements or obvious goiter  CHEST:  No shortness of breath, hyperventilation or cough  CARDIOVASCULAR:  No tachycardia or chest pain  ABDOMEN:  No nausea, vomiting, pain, constipation or diarrhea  URINARY:  No frequency, dysuria or sexual dysfunction  ENDOCRINE:  No polydipsia, polyuria  MUSCULOSKELETAL:  No pain or stiffness of the joints  NEUROLOGIC:  No weakness, sensory changes, seizures, confusion, memory loss, tremor or other abnormal " movements    Current Evaluation:     Nutritional Screening: Considering the patient's height and weight, medications, medical history and preferences, should a referral be made to the dietitian? no    Constitutional  Vitals:  Most recent vital signs, dated greater than 90 days prior to this appointment, were reviewed.    Vitals:    01/11/22 0806   BP: (!) 140/95   Pulse: 70   Weight: 119.5 kg (263 lb 7.2 oz)        General:  unremarkable, age appropriate     Musculoskeletal  Muscle Strength/Tone:  no tremor, no tic   Gait & Station:  non-ataxic     Psychiatric  Speech:  no latency; no press   Mood & Affect:  euthymic  congruent and appropriate   Thought Process:  normal and logical   Associations:  intact   Thought Content:  normal, no suicidality, no homicidality, delusions, or paranoia   Insight:  intact   Judgement: behavior is adequate to circumstances   Orientation:  grossly intact   Memory: intact for content of interview   Language: grossly intact   Attention Span & Concentration:  able to focus   Fund of Knowledge:  intact and appropriate to age and level of education       Relevant Elements of Neurological Exam: normal gait    Functioning in Relationships:  Spouse/partner: see above HPI  Peers: see above HPI  Employers: see above HPI    Laboratory Data  No visits with results within 1 Month(s) from this visit.   Latest known visit with results is:   Lab Visit on 07/18/2018   Component Date Value Ref Range Status    Sodium 07/18/2018 137  136 - 145 mmol/L Final    Potassium 07/18/2018 3.7  3.5 - 5.1 mmol/L Final    Chloride 07/18/2018 100  95 - 110 mmol/L Final    CO2 07/18/2018 28  23 - 29 mmol/L Final    Glucose 07/18/2018 90  70 - 110 mg/dL Final    BUN 07/18/2018 11  6 - 20 mg/dL Final    Creatinine 07/18/2018 1.0  0.5 - 1.4 mg/dL Final    Calcium 07/18/2018 9.8  8.7 - 10.5 mg/dL Final    Anion Gap 07/18/2018 9  8 - 16 mmol/L Final    eGFR if African American 07/18/2018 >60.0  >60 mL/min/1.73  m^2 Final    eGFR if non African American 07/18/2018 >60.0  >60 mL/min/1.73 m^2 Final    WBC 07/18/2018 9.39  3.90 - 12.70 K/uL Final    RBC 07/18/2018 5.38  4.60 - 6.20 M/uL Final    Hemoglobin 07/18/2018 15.6  14.0 - 18.0 g/dL Final    Hematocrit 07/18/2018 45.9  40.0 - 54.0 % Final    MCV 07/18/2018 85  82 - 98 fL Final    MCH 07/18/2018 29.0  27.0 - 31.0 pg Final    MCHC 07/18/2018 34.0  32.0 - 36.0 g/dL Final    RDW 07/18/2018 12.9  11.5 - 14.5 % Final    Platelets 07/18/2018 265  150 - 350 K/uL Final    MPV 07/18/2018 9.6  9.2 - 12.9 fL Final    Immature Granulocytes 07/18/2018 0.3  0.0 - 0.5 % Final    Gran # (ANC) 07/18/2018 6.9  1.8 - 7.7 K/uL Final    Immature Grans (Abs) 07/18/2018 0.03  0.00 - 0.04 K/uL Final    Lymph # 07/18/2018 1.5  1.0 - 4.8 K/uL Final    Mono # 07/18/2018 0.8  0.3 - 1.0 K/uL Final    Eos # 07/18/2018 0.2  0.0 - 0.5 K/uL Final    Baso # 07/18/2018 0.03  0.00 - 0.20 K/uL Final    nRBC 07/18/2018 0  0 /100 WBC Final    Gran % 07/18/2018 73.7* 38.0 - 73.0 % Final    Lymph % 07/18/2018 15.8* 18.0 - 48.0 % Final    Mono % 07/18/2018 8.3  4.0 - 15.0 % Final    Eosinophil % 07/18/2018 1.6  0.0 - 8.0 % Final    Basophil % 07/18/2018 0.3  0.0 - 1.9 % Final    Differential Method 07/18/2018 Automated   Final    CRP 07/18/2018 22.8* 0.0 - 8.2 mg/L Final    Uric Acid 07/18/2018 10.3* 3.4 - 7.0 mg/dL Final    Sed Rate 07/18/2018 28* 0 - 10 mm/Hr Final         Medications  Outpatient Encounter Medications as of 1/11/2022   Medication Sig Dispense Refill    buPROPion (WELLBUTRIN XL) 150 MG TB24 tablet Take 150 mg by mouth every morning.      buPROPion (WELLBUTRIN XL) 300 MG 24 hr tablet Take 1 tablet (300 mg total) by mouth once daily. 30 tablet 11    colchicine (COLCRYS) 0.6 mg tablet TAKE 1 TABLET BY MOUTH EVERY DAY 15 tablet 0    indomethacin (INDOCIN SR) 75 mg CpSR CR capsule Take 1 capsule (75 mg total) by mouth 2 (two) times daily. 30 capsule 0     No  facility-administered encounter medications on file as of 1/11/2022.     Assessment - Diagnosis - Goals:     Impression:  Pt is a 32- year old male with no past psych hx who is experiencing inattention problems with school.  Symptoms do not appear related to ADHD. Appears to have ineffective coping with workload.  Discussed behavior modification strategies and eliminating potential distractions from educational environment.       ICD-10-CM ICD-9-CM   1. Adjustment disorder with academic inhibition  F43.29 309.23   2. Inattention  R41.840 799.51       Strengths and Liabilities: Strength: Patient accepts guidance/feedback, Strength: Patient is expressive/articulate., Strength: Patient is intelligent., Liability: Patient lacks coping skills.    Treatment Goals:  Specify outcomes written in observable, behavioral terms:   Adustment Disorder:  will utilize behavior modification to adjustm to changes    Treatment Plan/Recommendations:   · Medication Management: The risks and benefits of medication were discussed with the patient.  · The treatment plan and follow up plan were reviewed with the patient.   · Reviewed available medical records and labs  · Continue Wellbutrin  mg po daily x 7 days then increase to Wellbutrin  mg daily  · Counseling focused on behavior modification and adaptive strategies to eliminate distractions such as social media and Internet.     Return to Clinic: as needed    Counseling time: 33 minutes  Total time: 60 minutes    Consulting clinician was informed of the encounter and consult note.

## 2022-01-17 PROBLEM — R41.840 INATTENTION: Status: ACTIVE | Noted: 2022-01-17

## 2022-01-17 PROBLEM — F43.29: Status: ACTIVE | Noted: 2022-01-17

## 2022-03-07 DIAGNOSIS — M1A.09X0 IDIOPATHIC CHRONIC GOUT OF MULTIPLE SITES WITHOUT TOPHUS: ICD-10-CM

## 2022-03-07 NOTE — TELEPHONE ENCOUNTER
Care Due:                  Date            Visit Type   Department     Provider  --------------------------------------------------------------------------------                                [facility]                              FOLLOWUP/OF  MED/ INTERNAL  Last Visit: 11-      FICE VISIT   MED/ TOIS      Ethan Garvey  Next Visit: None Scheduled  None         None Found                                                            Last  Test          Frequency    Reason                     Performed    Due Date  --------------------------------------------------------------------------------    Cr..........  12 months..  colchicine...............  Not Found    Overdue    Uric Acid...  12 months..  colchicine...............  Not Found    Overdue    Powered by [employer] by [email or web address]. Reference number: [id number].   3/07/2022 3:05:30 PM CST

## 2022-03-08 RX ORDER — COLCHICINE 0.6 MG/1
0.6 CAPSULE ORAL DAILY PRN
Qty: 30 CAPSULE | Refills: 0 | Status: SHIPPED | OUTPATIENT
Start: 2022-03-08

## 2022-03-16 DIAGNOSIS — Z11.59 NEED FOR HEPATITIS C SCREENING TEST: ICD-10-CM

## 2022-03-23 ENCOUNTER — PATIENT MESSAGE (OUTPATIENT)
Dept: FAMILY MEDICINE | Facility: CLINIC | Age: 32
End: 2022-03-23
Payer: COMMERCIAL

## 2022-03-23 DIAGNOSIS — M1A.09X0 IDIOPATHIC CHRONIC GOUT OF MULTIPLE SITES WITHOUT TOPHUS: Primary | ICD-10-CM

## 2022-03-23 RX ORDER — COLCHICINE 0.6 MG/1
0.6 TABLET ORAL DAILY
Qty: 90 TABLET | Refills: 0 | Status: SHIPPED | OUTPATIENT
Start: 2022-03-23 | End: 2023-03-23

## 2024-02-22 ENCOUNTER — PATIENT OUTREACH (OUTPATIENT)
Dept: ADMINISTRATIVE | Facility: HOSPITAL | Age: 34
End: 2024-02-22
Payer: COMMERCIAL